# Patient Record
(demographics unavailable — no encounter records)

---

## 2025-04-09 NOTE — HISTORY OF PRESENT ILLNESS
[FreeTextEntry1] : Patient with hx of hypertension , hyperlipidemia , inflammatory arthritis,after his CABG  July 18 2019 came for  follow up accompanied by daughter who helped in interpretation , patient is feeling fine , denies any chest pain or shortness of breath or dizziness, stopped taking metoprolol for ,last few months ,    Patient stopped taking  norvasc as it was causing constipation , tolerating HCTZ    his blood pressure is improved ,  echo showed mild LVH normal EF    Patient had blood work done in april 2025  [facility]  showed tc 137 LDL 80 ,HDL 34     sodium 138  K 4.2  nl CBC  C  reactive protein 0.1 ESR 11 HBa1c 6.4  ,   Patients had blood work with mild anemia which is resolved in current blood work  ,

## 2025-04-09 NOTE — CARDIOLOGY SUMMARY
[de-identified] : 2/29/24 sinus bradycardia non specific T changes  [de-identified] : 11/10/22    10.1  METS  87% MPHR  No ST changes , No ischemia  [de-identified] : 8/18/23   Mild LVH  paradoxical septal motion , mild to moderate PI , mild TR normal RVSP  EF 61% [de-identified] : 7/15/19 prox LAD 90%, Distal LAD 80%, Mid LCX 85%, distal RCA 90%, RPDA 60%  EF 60% [de-identified] : 7/18/19  LIMA TO LAD ,JEEVAN TO RCA radial graft to LCX  agitation/med management

## 2025-04-09 NOTE — CARDIOLOGY SUMMARY
[de-identified] : 2/29/24 sinus bradycardia non specific T changes  [de-identified] : 11/10/22    10.1  METS  87% MPHR  No ST changes , No ischemia  [de-identified] : 8/18/23   Mild LVH  paradoxical septal motion , mild to moderate PI , mild TR normal RVSP  EF 61% [de-identified] : 7/15/19 prox LAD 90%, Distal LAD 80%, Mid LCX 85%, distal RCA 90%, RPDA 60%  EF 60% [de-identified] : 7/18/19  LIMA TO LAD ,JEEVAN TO RCA radial graft to LCX

## 2025-04-09 NOTE — DISCUSSION/SUMMARY
[FreeTextEntry1] : CHÁVEZ chronic  multifactorial : Normal EF , no ischemia , encourage to loose weight , increase exercise   chest pain atypical possibley GERD  resolved , normal nuclear stress test , continue medical management , contiue BB , statin , ecotrin  CAD CABG x 3 , continue current medication. Education material for heart healthy diet given.  Abnormal EKG , post operative changes , normal echocardiogram continue metoprolol 100 mg po daily  HTN controlled , Discussed importance of being on low salt diet , Continue current medication regimen Lopressor , losartan 100 mg po daily  did not take  HCTZ at all , home bp check blood  pressure , will combined pill   Hyperlipidemia improved on medication. still: suboptimal LDL , will increase atorvastatin to 80 mg po daily ?? he is taking 40 mg  now will repeat blood work    [EKG obtained to assist in diagnosis and management of assessed problem(s)] : EKG obtained to assist in diagnosis and management of assessed problem(s)

## 2025-07-30 NOTE — DISCUSSION/SUMMARY
[FreeTextEntry1] : CHÁVEZ chronic  multifactorial : Normal EF , no ischemia , encourage to loose weight , increase exercise   chest pain atypical possibley GERD  resolved , normal nuclear stress test , continue medical management , contiue BB , statin , ecotrin  CAD CABG x 3 , continue current medication. Education material for heart healthy diet given.  Abnormal EKG , post operative changes , normal echocardiogram continue metoprolol 100 mg po daily  HTN controlled , Discussed importance of being on low salt diet , Continue current medication regimen Lopressor , losartan 100 mg po daily  did not take  HCTZ at all , home bp check blood  pressure , will combined pill   Hyperlipidemia improved on medication. still: suboptimal LDL , will increase atorvastatin to 80 mg po daily ?? he is taking 40 mg  now will repeat blood work

## 2025-07-30 NOTE — PHYSICAL EXAM
[Well Developed] : well developed [Well Nourished] : well nourished [No Acute Distress] : no acute distress [Normal Conjunctiva] : normal conjunctiva [Normal Venous Pressure] : normal venous pressure [No Carotid Bruit] : no carotid bruit [Normal Rate] : normal [Normal S1] : normal S1 [Normal S2] : normal S2 [S3] : no S3 [S4] : no S4 [No Murmur] : no murmurs heard [No Pitting Edema] : no pitting edema present [Right Carotid Bruit] : no bruit heard over the right carotid [Left Carotid Bruit] : no bruit heard over the left carotid [Right Femoral Bruit] : no bruit heard over the right femoral artery [Left Femoral Bruit] : no bruit heard over the left femoral artery [2+] : left 2+ [No Abnormalities] : the abdominal aorta was not enlarged and no bruit was heard [Clear Lung Fields] : clear lung fields [Good Air Entry] : good air entry [No Respiratory Distress] : no respiratory distress  [Soft] : abdomen soft [Non Tender] : non-tender [Normal Bowel Sounds] : normal bowel sounds [Normal Gait] : normal gait [No Edema] : no edema [No Cyanosis] : no cyanosis [No Clubbing] : no clubbing [No Varicosities] : no varicosities [No Rash] : no rash [No Skin Lesions] : no skin lesions [Moves all extremities] : moves all extremities [No Focal Deficits] : no focal deficits [Normal Speech] : normal speech [Alert and Oriented] : alert and oriented [Normal memory] : normal memory

## 2025-07-30 NOTE — CARDIOLOGY SUMMARY
[de-identified] : 2/29/24 sinus bradycardia non specific T changes  [de-identified] : 11/10/22    10.1  METS  87% MPHR  No ST changes , No ischemia  [de-identified] : 8/18/23   Mild LVH  paradoxical septal motion , mild to moderate PI , mild TR normal RVSP  EF 61% [de-identified] : 7/15/19 prox LAD 90%, Distal LAD 80%, Mid LCX 85%, distal RCA 90%, RPDA 60%  EF 60% [de-identified] : 7/18/19  LIMA TO LAD ,JEEVAN TO RCA radial graft to LCX

## 2025-07-30 NOTE — CARDIOLOGY SUMMARY
[de-identified] : 2/29/24 sinus bradycardia non specific T changes  [de-identified] : 11/10/22    10.1  METS  87% MPHR  No ST changes , No ischemia  [de-identified] : 8/18/23   Mild LVH  paradoxical septal motion , mild to moderate PI , mild TR normal RVSP  EF 61% [de-identified] : 7/15/19 prox LAD 90%, Distal LAD 80%, Mid LCX 85%, distal RCA 90%, RPDA 60%  EF 60% [de-identified] : 7/18/19  LIMA TO LAD ,JEEVAN TO RCA radial graft to LCX